# Patient Record
Sex: MALE | Race: WHITE
[De-identification: names, ages, dates, MRNs, and addresses within clinical notes are randomized per-mention and may not be internally consistent; named-entity substitution may affect disease eponyms.]

---

## 2017-08-03 ENCOUNTER — HOSPITAL ENCOUNTER (OUTPATIENT)
Dept: HOSPITAL 83 - US | Age: 47
Discharge: HOME | End: 2017-08-03
Attending: NURSE PRACTITIONER
Payer: COMMERCIAL

## 2017-08-03 DIAGNOSIS — I42.8: ICD-10-CM

## 2017-08-03 DIAGNOSIS — M79.662: ICD-10-CM

## 2017-08-03 DIAGNOSIS — M79.671: ICD-10-CM

## 2017-08-03 DIAGNOSIS — E66.9: ICD-10-CM

## 2017-08-03 DIAGNOSIS — M79.672: Primary | ICD-10-CM

## 2017-08-03 DIAGNOSIS — M79.661: ICD-10-CM

## 2018-06-08 ENCOUNTER — HOSPITAL ENCOUNTER (INPATIENT)
Dept: HOSPITAL 83 - ED | Age: 48
LOS: 3 days | Discharge: HOME | DRG: 292 | End: 2018-06-11
Attending: EMERGENCY MEDICINE | Admitting: EMERGENCY MEDICINE
Payer: COMMERCIAL

## 2018-06-08 VITALS — HEIGHT: 65 IN | WEIGHT: 315 LBS | BODY MASS INDEX: 52.48 KG/M2

## 2018-06-08 VITALS — SYSTOLIC BLOOD PRESSURE: 146 MMHG | DIASTOLIC BLOOD PRESSURE: 93 MMHG

## 2018-06-08 VITALS — DIASTOLIC BLOOD PRESSURE: 65 MMHG

## 2018-06-08 VITALS — DIASTOLIC BLOOD PRESSURE: 93 MMHG

## 2018-06-08 VITALS — DIASTOLIC BLOOD PRESSURE: 94 MMHG

## 2018-06-08 VITALS — SYSTOLIC BLOOD PRESSURE: 132 MMHG | DIASTOLIC BLOOD PRESSURE: 87 MMHG

## 2018-06-08 VITALS — SYSTOLIC BLOOD PRESSURE: 132 MMHG | DIASTOLIC BLOOD PRESSURE: 70 MMHG

## 2018-06-08 VITALS — DIASTOLIC BLOOD PRESSURE: 70 MMHG

## 2018-06-08 VITALS — DIASTOLIC BLOOD PRESSURE: 84 MMHG

## 2018-06-08 DIAGNOSIS — E78.2: ICD-10-CM

## 2018-06-08 DIAGNOSIS — J44.9: ICD-10-CM

## 2018-06-08 DIAGNOSIS — I11.0: Primary | ICD-10-CM

## 2018-06-08 DIAGNOSIS — Z84.1: ICD-10-CM

## 2018-06-08 DIAGNOSIS — Z88.8: ICD-10-CM

## 2018-06-08 DIAGNOSIS — I42.9: ICD-10-CM

## 2018-06-08 DIAGNOSIS — E66.01: ICD-10-CM

## 2018-06-08 DIAGNOSIS — Z95.810: ICD-10-CM

## 2018-06-08 DIAGNOSIS — F41.1: ICD-10-CM

## 2018-06-08 DIAGNOSIS — I50.23: ICD-10-CM

## 2018-06-08 DIAGNOSIS — M19.90: ICD-10-CM

## 2018-06-08 DIAGNOSIS — Z79.899: ICD-10-CM

## 2018-06-08 DIAGNOSIS — Z82.49: ICD-10-CM

## 2018-06-08 DIAGNOSIS — Z80.9: ICD-10-CM

## 2018-06-08 DIAGNOSIS — I34.0: ICD-10-CM

## 2018-06-08 DIAGNOSIS — K21.9: ICD-10-CM

## 2018-06-08 DIAGNOSIS — R65.10: ICD-10-CM

## 2018-06-08 LAB
ALBUMIN SERPL-MCNC: 3.3 GM/DL (ref 3.1–4.5)
ALP SERPL-CCNC: 90 U/L (ref 45–117)
ALT SERPL W P-5'-P-CCNC: 39 U/L (ref 12–78)
AST SERPL-CCNC: 65 IU/L (ref 3–35)
BASOPHILS # BLD AUTO: 1 % (ref 0–1)
BUN SERPL-MCNC: 12 MG/DL (ref 7–24)
CHLORIDE SERPL-SCNC: 105 MMOL/L (ref 98–107)
CREAT SERPL-MCNC: 0.91 MG/DL (ref 0.7–1.3)
ERYTHROCYTE [DISTWIDTH] IN BLOOD BY AUTOMATED COUNT: 13.2 % (ref 0–14.5)
HCT VFR BLD AUTO: 41.3 % (ref 42–52)
HGB BLD-MCNC: 13.6 G/DL (ref 14–18)
MCH RBC QN AUTO: 30 PG (ref 27–31)
MCHC RBC AUTO-ENTMCNC: 32.9 G/DL (ref 33–37)
MCV RBC AUTO: 91 FL (ref 80–94)
NRBC BLD QL AUTO: 0 % (ref 0–0)
PLATELET # BLD AUTO: 201 10*3/UL (ref 130–400)
PLATELET SUFFICIENCY: NORMAL
PMV BLD AUTO: 9.6 FL (ref 9.6–12.3)
POTASSIUM SERPL-SCNC: 4.5 MMOL/L (ref 3.5–5.1)
PROT SERPL-MCNC: 7.1 GM/DL (ref 6.4–8.2)
RBC # BLD AUTO: 4.54 10*6/UL (ref 4.5–5.9)
RBC MORPH BLD: NORMAL
SODIUM SERPL-SCNC: 138 MMOL/L (ref 136–145)
TOTAL CELLS COUNTED: 100 #CELLS
TROPONIN I SERPL-MCNC: 0.06 NG/ML (ref ?–0.04)
WBC NRBC COR # BLD AUTO: 12.6 10*3/UL (ref 4.8–10.8)

## 2018-06-09 VITALS — SYSTOLIC BLOOD PRESSURE: 133 MMHG | DIASTOLIC BLOOD PRESSURE: 65 MMHG

## 2018-06-09 VITALS — SYSTOLIC BLOOD PRESSURE: 112 MMHG | DIASTOLIC BLOOD PRESSURE: 80 MMHG

## 2018-06-09 VITALS — DIASTOLIC BLOOD PRESSURE: 43 MMHG

## 2018-06-09 VITALS — SYSTOLIC BLOOD PRESSURE: 118 MMHG | DIASTOLIC BLOOD PRESSURE: 72 MMHG

## 2018-06-09 VITALS — DIASTOLIC BLOOD PRESSURE: 62 MMHG

## 2018-06-09 LAB
25(OH)D3 SERPL-MCNC: 15.9 NG/ML (ref 30–100)
ALBUMIN SERPL-MCNC: 3.5 GM/DL (ref 3.1–4.5)
ALP SERPL-CCNC: 76 U/L (ref 45–117)
ALT SERPL W P-5'-P-CCNC: 36 U/L (ref 12–78)
AST SERPL-CCNC: 37 IU/L (ref 3–35)
BUN SERPL-MCNC: 16 MG/DL (ref 7–24)
CHLORIDE SERPL-SCNC: 98 MMOL/L (ref 98–107)
CHOLEST SERPL-MCNC: 122 MG/DL (ref ?–200)
CREAT SERPL-MCNC: 1.11 MG/DL (ref 0.7–1.3)
ERYTHROCYTE [DISTWIDTH] IN BLOOD BY AUTOMATED COUNT: 13.4 % (ref 0–14.5)
HCT VFR BLD AUTO: 42.3 % (ref 42–52)
HDLC SERPL-MCNC: 26 MG/DL (ref 40–60)
HGB BLD-MCNC: 13.8 G/DL (ref 14–18)
LDLC SERPL DIRECT ASSAY-MCNC: 37 MG/DL (ref 9–159)
MCH RBC QN AUTO: 29.7 PG (ref 27–31)
MCHC RBC AUTO-ENTMCNC: 32.6 G/DL (ref 33–37)
MCV RBC AUTO: 91 FL (ref 80–94)
NRBC BLD QL AUTO: 0 % (ref 0–0)
PHOSPHATE SERPL-MCNC: 4.3 MG/DL (ref 2.5–4.9)
PLATELET # BLD AUTO: 231 10*3/UL (ref 130–400)
PLATELET SUFFICIENCY: NORMAL
PMV BLD AUTO: 9.6 FL (ref 9.6–12.3)
POTASSIUM SERPL-SCNC: 3.9 MMOL/L (ref 3.5–5.1)
PROT SERPL-MCNC: 7.2 GM/DL (ref 6.4–8.2)
RBC # BLD AUTO: 4.65 10*6/UL (ref 4.5–5.9)
RBC MORPH BLD: NORMAL
SODIUM SERPL-SCNC: 137 MMOL/L (ref 136–145)
T4 FREE SERPL-MCNC: 1.06 NG/DL (ref 0.76–1.46)
TOTAL CELLS COUNTED: 100 #CELLS
TRIGL SERPL-MCNC: 295 MG/DL (ref ?–150)
TSH SERPL DL<=0.005 MIU/L-ACNC: 3.43 UIU/ML (ref 0.36–4.75)
VITAMIN B12: 256 PG/ML (ref 247–911)
VLDLC SERPL CALC-MCNC: 59 MG/DL (ref 6–40)
WBC NRBC COR # BLD AUTO: 15.4 10*3/UL (ref 4.8–10.8)

## 2018-06-10 VITALS — DIASTOLIC BLOOD PRESSURE: 71 MMHG | SYSTOLIC BLOOD PRESSURE: 113 MMHG

## 2018-06-10 VITALS — DIASTOLIC BLOOD PRESSURE: 55 MMHG | SYSTOLIC BLOOD PRESSURE: 109 MMHG

## 2018-06-10 VITALS — DIASTOLIC BLOOD PRESSURE: 70 MMHG | SYSTOLIC BLOOD PRESSURE: 121 MMHG

## 2018-06-10 VITALS — DIASTOLIC BLOOD PRESSURE: 67 MMHG

## 2018-06-10 VITALS — DIASTOLIC BLOOD PRESSURE: 64 MMHG

## 2018-06-10 LAB
BASOPHILS # BLD AUTO: 1 % (ref 0–1)
BUN SERPL-MCNC: 22 MG/DL (ref 7–24)
CHLORIDE SERPL-SCNC: 97 MMOL/L (ref 98–107)
CREAT SERPL-MCNC: 0.93 MG/DL (ref 0.7–1.3)
ERYTHROCYTE [DISTWIDTH] IN BLOOD BY AUTOMATED COUNT: 13.4 % (ref 0–14.5)
HCT VFR BLD AUTO: 40.5 % (ref 42–52)
HGB BLD-MCNC: 13.6 G/DL (ref 14–18)
MCH RBC QN AUTO: 30.3 PG (ref 27–31)
MCHC RBC AUTO-ENTMCNC: 33.6 G/DL (ref 33–37)
MCV RBC AUTO: 90.2 FL (ref 80–94)
NRBC BLD QL AUTO: 0 % (ref 0–0)
PLATELET # BLD AUTO: 225 10*3/UL (ref 130–400)
PLATELET SUFFICIENCY: NORMAL
PMV BLD AUTO: 9.5 FL (ref 9.6–12.3)
POTASSIUM SERPL-SCNC: 3.7 MMOL/L (ref 3.5–5.1)
RBC # BLD AUTO: 4.49 10*6/UL (ref 4.5–5.9)
RBC MORPH BLD: NORMAL
SODIUM SERPL-SCNC: 136 MMOL/L (ref 136–145)
TOTAL CELLS COUNTED: 100 #CELLS
WBC NRBC COR # BLD AUTO: 15.5 10*3/UL (ref 4.8–10.8)

## 2018-06-11 VITALS — DIASTOLIC BLOOD PRESSURE: 58 MMHG

## 2018-06-11 VITALS — SYSTOLIC BLOOD PRESSURE: 124 MMHG | DIASTOLIC BLOOD PRESSURE: 77 MMHG

## 2018-06-11 VITALS — DIASTOLIC BLOOD PRESSURE: 63 MMHG

## 2018-06-11 VITALS — DIASTOLIC BLOOD PRESSURE: 45 MMHG | SYSTOLIC BLOOD PRESSURE: 105 MMHG

## 2018-06-11 LAB
BUN SERPL-MCNC: 23 MG/DL (ref 7–24)
CHLORIDE SERPL-SCNC: 97 MMOL/L (ref 98–107)
CREAT SERPL-MCNC: 1.09 MG/DL (ref 0.7–1.3)
ERYTHROCYTE [DISTWIDTH] IN BLOOD BY AUTOMATED COUNT: 13.6 % (ref 0–14.5)
HCT VFR BLD AUTO: 42.3 % (ref 42–52)
HGB BLD-MCNC: 13.9 G/DL (ref 14–18)
MCH RBC QN AUTO: 30.3 PG (ref 27–31)
MCHC RBC AUTO-ENTMCNC: 32.9 G/DL (ref 33–37)
MCV RBC AUTO: 92.2 FL (ref 80–94)
NRBC BLD QL AUTO: 0 % (ref 0–0)
PLATELET # BLD AUTO: 252 10*3/UL (ref 130–400)
PLATELET SUFFICIENCY: NORMAL
PMV BLD AUTO: 9.4 FL (ref 9.6–12.3)
POTASSIUM SERPL-SCNC: 3.7 MMOL/L (ref 3.5–5.1)
RBC # BLD AUTO: 4.59 10*6/UL (ref 4.5–5.9)
RBC MORPH BLD: NORMAL
SODIUM SERPL-SCNC: 136 MMOL/L (ref 136–145)
TOTAL CELLS COUNTED: 100 #CELLS
VARIANT LYMPHS NFR BLD MANUAL: 1 % (ref 0–0)
WBC NRBC COR # BLD AUTO: 17.3 10*3/UL (ref 4.8–10.8)

## 2018-12-29 ENCOUNTER — HOSPITAL ENCOUNTER (EMERGENCY)
Dept: HOSPITAL 83 - ED | Age: 48
Discharge: HOME | End: 2018-12-29
Payer: COMMERCIAL

## 2018-12-29 VITALS — WEIGHT: 295 LBS | BODY MASS INDEX: 49.15 KG/M2 | HEIGHT: 65 IN

## 2018-12-29 DIAGNOSIS — M13.859: ICD-10-CM

## 2018-12-29 DIAGNOSIS — S96.912A: Primary | ICD-10-CM
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
## 2018-12-29 DIAGNOSIS — I50.20: ICD-10-CM

## 2018-12-29 DIAGNOSIS — Z79.899: ICD-10-CM

## 2018-12-29 DIAGNOSIS — J44.9: ICD-10-CM

## 2018-12-29 DIAGNOSIS — E66.01: ICD-10-CM
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
## 2018-12-29 DIAGNOSIS — I11.0: ICD-10-CM

## 2018-12-29 DIAGNOSIS — Z88.8: ICD-10-CM
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
## 2018-12-29 DIAGNOSIS — S93.402A: ICD-10-CM

## 2019-10-03 ENCOUNTER — HOSPITAL ENCOUNTER (OUTPATIENT)
Dept: HOSPITAL 83 - RAD | Age: 49
Discharge: HOME | End: 2019-10-03
Attending: NURSE PRACTITIONER
Payer: COMMERCIAL

## 2019-10-03 DIAGNOSIS — R20.2: Primary | ICD-10-CM

## 2019-10-03 DIAGNOSIS — M47.812: ICD-10-CM

## 2020-02-12 ENCOUNTER — HOSPITAL ENCOUNTER (INPATIENT)
Dept: HOSPITAL 83 - ED | Age: 50
LOS: 4 days | Discharge: HOME | DRG: 871 | End: 2020-02-16
Attending: INTERNAL MEDICINE | Admitting: INTERNAL MEDICINE
Payer: COMMERCIAL

## 2020-02-12 VITALS — WEIGHT: 315 LBS | BODY MASS INDEX: 50.62 KG/M2 | HEIGHT: 65.98 IN

## 2020-02-12 VITALS — DIASTOLIC BLOOD PRESSURE: 70 MMHG | SYSTOLIC BLOOD PRESSURE: 133 MMHG

## 2020-02-12 VITALS — DIASTOLIC BLOOD PRESSURE: 68 MMHG

## 2020-02-12 DIAGNOSIS — I11.0: ICD-10-CM

## 2020-02-12 DIAGNOSIS — F33.2: ICD-10-CM

## 2020-02-12 DIAGNOSIS — I50.43: ICD-10-CM

## 2020-02-12 DIAGNOSIS — G93.41: ICD-10-CM

## 2020-02-12 DIAGNOSIS — Z84.1: ICD-10-CM

## 2020-02-12 DIAGNOSIS — M16.10: ICD-10-CM

## 2020-02-12 DIAGNOSIS — Z88.8: ICD-10-CM

## 2020-02-12 DIAGNOSIS — Z79.82: ICD-10-CM

## 2020-02-12 DIAGNOSIS — I42.0: ICD-10-CM

## 2020-02-12 DIAGNOSIS — R73.9: ICD-10-CM

## 2020-02-12 DIAGNOSIS — E80.6: ICD-10-CM

## 2020-02-12 DIAGNOSIS — Z83.3: ICD-10-CM

## 2020-02-12 DIAGNOSIS — Z79.899: ICD-10-CM

## 2020-02-12 DIAGNOSIS — Z95.0: ICD-10-CM

## 2020-02-12 DIAGNOSIS — Z80.9: ICD-10-CM

## 2020-02-12 DIAGNOSIS — A41.9: Primary | ICD-10-CM

## 2020-02-12 DIAGNOSIS — E83.41: ICD-10-CM

## 2020-02-12 DIAGNOSIS — F41.1: ICD-10-CM

## 2020-02-12 DIAGNOSIS — E66.01: ICD-10-CM

## 2020-02-12 DIAGNOSIS — J44.9: ICD-10-CM

## 2020-02-12 LAB
ALBUMIN SERPL-MCNC: 3.5 GM/DL (ref 3.1–4.5)
ALP SERPL-CCNC: 111 U/L (ref 45–117)
ALT SERPL W P-5'-P-CCNC: 63 U/L (ref 12–78)
AST SERPL-CCNC: 132 IU/L (ref 3–35)
BUN SERPL-MCNC: 25 MG/DL (ref 7–24)
CHLORIDE SERPL-SCNC: 103 MMOL/L (ref 98–107)
CREAT SERPL-MCNC: 1.3 MG/DL (ref 0.7–1.3)
ERYTHROCYTE [DISTWIDTH] IN BLOOD BY AUTOMATED COUNT: 13.9 % (ref 0–14.5)
HCT VFR BLD AUTO: 46.9 % (ref 42–52)
HGB BLD-MCNC: 15.1 G/DL (ref 14–18)
MCH RBC QN AUTO: 29.3 PG (ref 27–31)
MCHC RBC AUTO-ENTMCNC: 32.2 G/DL (ref 33–37)
MCV RBC AUTO: 90.9 FL (ref 80–94)
NRBC BLD QL AUTO: 0 % (ref 0–0)
PLATELET # BLD AUTO: 263 10*3/UL (ref 130–400)
PLATELET SUFFICIENCY: NORMAL
PMV BLD AUTO: 9.5 FL (ref 9.6–12.3)
POLYCHROMASIA BLD QL SMEAR: SLIGHT
POTASSIUM SERPL-SCNC: 4 MMOL/L (ref 3.5–5.1)
PROT SERPL-MCNC: 7.2 GM/DL (ref 6.4–8.2)
RBC # BLD AUTO: 5.16 10*6/UL (ref 4.5–5.9)
SODIUM SERPL-SCNC: 136 MMOL/L (ref 136–145)
TOTAL CELLS COUNTED: 100 #CELLS
TROPONIN I SERPL-MCNC: 0.05 NG/ML (ref ?–0.04)
WBC NRBC COR # BLD AUTO: 18.4 10*3/UL (ref 4.8–10.8)

## 2020-02-13 VITALS — DIASTOLIC BLOOD PRESSURE: 80 MMHG

## 2020-02-13 VITALS — DIASTOLIC BLOOD PRESSURE: 86 MMHG

## 2020-02-13 VITALS — DIASTOLIC BLOOD PRESSURE: 63 MMHG | SYSTOLIC BLOOD PRESSURE: 101 MMHG

## 2020-02-13 VITALS — SYSTOLIC BLOOD PRESSURE: 105 MMHG | DIASTOLIC BLOOD PRESSURE: 71 MMHG

## 2020-02-13 VITALS — DIASTOLIC BLOOD PRESSURE: 70 MMHG | SYSTOLIC BLOOD PRESSURE: 118 MMHG

## 2020-02-13 VITALS — SYSTOLIC BLOOD PRESSURE: 120 MMHG | DIASTOLIC BLOOD PRESSURE: 71 MMHG

## 2020-02-13 VITALS — SYSTOLIC BLOOD PRESSURE: 100 MMHG | DIASTOLIC BLOOD PRESSURE: 75 MMHG

## 2020-02-13 VITALS — DIASTOLIC BLOOD PRESSURE: 93 MMHG

## 2020-02-13 VITALS — DIASTOLIC BLOOD PRESSURE: 67 MMHG

## 2020-02-13 VITALS — DIASTOLIC BLOOD PRESSURE: 79 MMHG | SYSTOLIC BLOOD PRESSURE: 118 MMHG

## 2020-02-13 LAB
25(OH)D3 SERPL-MCNC: 53.4 NG/ML (ref 30–100)
ALBUMIN SERPL-MCNC: 3.7 GM/DL (ref 3.1–4.5)
ALP SERPL-CCNC: 107 U/L (ref 45–117)
ALT SERPL W P-5'-P-CCNC: 73 U/L (ref 12–78)
APTT PPP: 30.4 SECONDS (ref 20–32.1)
AST SERPL-CCNC: 165 IU/L (ref 3–35)
BASE EXCESS BLDA CALC-SCNC: -1.7 MMOL/L (ref -2–2)
BUN SERPL-MCNC: 26 MG/DL (ref 7–24)
CHLORIDE SERPL-SCNC: 105 MMOL/L (ref 98–107)
CHOLEST SERPL-MCNC: 97 MG/DL (ref ?–200)
CREAT SERPL-MCNC: 1.4 MG/DL (ref 0.7–1.3)
ERYTHROCYTE [DISTWIDTH] IN BLOOD BY AUTOMATED COUNT: 13.9 % (ref 0–14.5)
HCO3 BLDA-SCNC: 23 MMOL/L (ref 22–26)
HCT VFR BLD AUTO: 46.5 % (ref 42–52)
HDLC SERPL-MCNC: 22 MG/DL (ref 40–60)
HGB BLD-MCNC: 15.1 G/DL (ref 14–18)
INR BLD: 1.1 (ref 2–3.5)
LDLC SERPL DIRECT ASSAY-MCNC: 51 MG/DL (ref 9–159)
MCH RBC QN AUTO: 29.5 PG (ref 27–31)
MCHC RBC AUTO-ENTMCNC: 32.5 G/DL (ref 33–37)
MCV RBC AUTO: 91 FL (ref 80–94)
NRBC BLD QL AUTO: 0 10*3/UL (ref 0–0)
PCO2 BLDA: 39 MMHG (ref 35–45)
PH BLDA: 7.38 [PH] (ref 7.35–7.45)
PHOSPHATE SERPL-MCNC: 4.9 MG/DL (ref 2.5–4.9)
PLATELET # BLD AUTO: 240 10*3/UL (ref 130–400)
PLATELET SUFFICIENCY: NORMAL
PMV BLD AUTO: 10.3 FL (ref 9.6–12.3)
PO2 BLDA: 82 MMHG (ref 80–90)
POTASSIUM SERPL-SCNC: 3.8 MMOL/L (ref 3.5–5.1)
PROT SERPL-MCNC: 7.5 GM/DL (ref 6.4–8.2)
RBC # BLD AUTO: 5.11 10*6/UL (ref 4.5–5.9)
SAO2 % BLDA: 96 % (ref 95–97)
SODIUM SERPL-SCNC: 138 MMOL/L (ref 136–145)
T4 FREE SERPL-MCNC: 1.34 NG/DL (ref 0.76–1.46)
TOTAL CELLS COUNTED: 100 #CELLS
TRIGL SERPL-MCNC: 118 MG/DL (ref ?–150)
TSH SERPL DL<=0.005 MIU/L-ACNC: 2.93 UIU/ML (ref 0.36–4.75)
VARIANT LYMPHS NFR BLD MANUAL: 1 % (ref 0–0)
VITAMIN B12: 363 PG/ML (ref 247–911)
VLDLC SERPL CALC-MCNC: 24 MG/DL (ref 6–40)
WBC NRBC COR # BLD AUTO: 18.6 10*3/UL (ref 4.8–10.8)

## 2020-02-13 PROCEDURE — 4B02XSZ MEASUREMENT OF CARDIAC PACEMAKER, EXTERNAL APPROACH: ICD-10-PCS | Performed by: INTERNAL MEDICINE

## 2020-02-13 NOTE — NUR
0130 DR MCFARLAND MADE AWARE THE PATIENT CANNOT LAY DOWN AT THIS TIME FOR HTE CAT
SCAN . LEO WETZEL RN.

## 2020-02-13 NOTE — NUR
DR PATEL IN TO SEE PT.  UPDATED HIM ON PT'S CONDITION AND PLAN OF CARE.  NO
NEW ORDERS AT THIS TIME.

## 2020-02-13 NOTE — NUR
PHYSICAL THERAPY
 
Screen received pt from home admit [facility] CHF sycopal episodes w multiple falls
pt would benefit from PT eval to assess mobility and safety.
 
 
Sherlyn Munoz PT

## 2020-02-13 NOTE — NUR
DAYANARAAUGUSTO [id number]
 
Please refer to the physician's history and physical for past medical history,
comorbid conditions, and allergies.
   Diagnosis: CHF,NSTEMI,PACEMAKER COMPLICATIONS
 
   Nicholas Score: 20,LOW OR NO RISK
 
WOUND DESCRIPTIONS:
WOUND #1 RIGHT GROIN/THIGH/ABD FOLD RED WITH MUSTY ODOR NOTED AT TIME OF
ASSESSMENT. UNABLE TO OBTAIN ACCURATE MEASUREMENTS DUE TO PATIENT WAS FALLING
ASLEEP DURING ASSESSMENT. UNABLE TO ASSESS LEFT SIDE GROIN AT THIS TIME.
 
Wound Number: 2
Location of the wound: LEFT ANTERIOR LOWER LEG
Type of wound: TRAUMATIC
Thickness: Full
Size: 1cm X 1.2cm X 0.1cm
Tunneling: NONE
Undermining: NONE
Sinus Tract: NONE
Presence of Exudate: Serous
Amount: Light
Color: Yellow
Odor: None
Periwound Skin Appearance: Edema
Wound edges: APPROXIMATED
Pain (associated with wound): DENIED AT TIME OF ASSESSMENT
How does patient state this happened? NURSE CARING FOR PATIENT STATES THE
PATIENT STATED HE HIT THIS AREA WHEN HE FELL AT HOME.
   Surface the patient is resting on: Isoflex
 
SKIN PREVENTION RECOMMENDATION:
   1.  Pressure redistribution support surface as appropriate
   2.  Elevate heels
   3.  Remove boots/TEDS every shift and reapply
   4.  Head of bed 30 degrees as tolerated
   5.  Assess nutrition and hydration
   6.  Manage moisture
   7.  Avoid the use of containment devices while in bed
   8.  Use absorptive products on surfaces limit layers of linens on bed
   9.  Turn and reposition every 1-2 hours in bed and every 1 hour in chair as
       tolerated
   10. Weight shifts every 15 minutes while up in chair
   11. Offloading with pillows or device to keep heels elevated off bed
   12. Monitor skin at least every shift
   13. Inspect under medical devices twice a day
 
 
WOUND TREATMENT RECOMMENDATIONS:
FULL THICKNESS GUIDELINES LEFT ANTERIOR LOWER LEG: CLEANSE WITH NSS APPLY
SUREPREP AROUND THE WOUND BED ALLOW TO DRY APPLY THERAHONEY TO WOUND BED COVER
WITH OPTIFOAM GENTLE. CHANGE EVERY 2 DAYS AND PRN SOILING.
DRESSING CHANGE: CLEANSE GROIN AND FOLDS WITH SOAP AND WATER. CONTINUE
NYSTATIN POWDER TO GROIN AND FOLDS.

## 2020-02-13 NOTE — NUR
HAYDEE NICHOLE [id number]
 
Please refer to the physician's history and physical for past medical history,
comorbid conditions, and allergies.
   Diagnosis: CHF,NSTEMI,PACEMAKER COMPLICATIONS
 
   Nicholas Score: 20,LOW OR NO RISK
 
WOUND DESCRIPTIONS:
This nurse went to evaluate patient for skin impairment noted to groin patient
refused assessment at this time. Musty odor noted at time of assessment per
nurse caring for patient.
   Surface the patient is resting on: Isoflex
 
SKIN PREVENTION RECOMMENDATION:
   1.  Pressure redistribution support surface as appropriate
   2.  Elevate heels
   3.  Remove boots/TEDS every shift and reapply
   4.  Head of bed 30 degrees as tolerated
   5.  Assess nutrition and hydration
   6.  Manage moisture
   7.  Avoid the use of containment devices while in bed
   8.  Use absorptive products on surfaces limit layers of linens on bed
   9.  Turn and reposition every 1-2 hours in bed and every 1 hour in chair as
       tolerated
   10. Weight shifts every 15 minutes while up in chair
   11. Offloading with pillows or device to keep heels elevated off bed
   12. Monitor skin at least every shift
   13. Inspect under medical devices twice a day
 
WOUND TREATMENT RECOMMENDATIONS:
Nystatin powder was ordered by the provider for every 12 hours.

## 2020-02-13 NOTE — NUR
PT IS AWAKE AND ALERT.  PT IS TALKING WITH HIS BROTHER ON THE PHONE.  PT
DENIES COMPLAINTS AT PRESENT TIME.  NO ACUTE DISTRESS NOTED.

## 2020-02-13 NOTE — NUR
NOTED ON CARDIAC MONITOR THE PATIENTS HEART RATE WAS AT 42. THEN 36. THE MD
MADE AWARE. THE PATIENT REMAINS AWAKE ALERT AND ORIENTED AT THE TIME DENIES ANY
CHEST PAIN OR SOB. LEO WETZEL RN.
 
0010 PT PLACED ON 3 LITERS NC DUE TO THE PATIENT DOZES OFF AND PULSE OX DROPS
TO 87%. THE PATIENT STATES HE IS UNABLE TO TOLERATE THE NC STATES IT NAKES HIM
ANXIOUS. AND TOOK IT OFF. LEO WETZEL RN.

## 2020-02-13 NOTE — NUR
[employer] REP NOTIFIED DUE TO PT'S HEARTRATE DROPPING INTO THE 30'S DESPITE
HAVING A PACEMAKER.---BRIANA DENTON RN

## 2020-02-13 NOTE — NUR
PT REMAINS SLEEPING IN RECLINER CHAIR. PT IS SOMNOLENT. TWITCHES AND TALKS TO
SELF. PT MOVES ABOUT CONSTANTLY IN THE CHAIR. BED PLACED IN FRONT OF PT CHAIR
TO PREVENT HIM FROM FALLING FORWARD. 02 INTACT. HEP LOCK INTACT. MONITOR
REMAINS 100% PACED RHYTHM. BODY ALARM INTACT. CONDITION GUARDED.

## 2020-02-13 NOTE — NUR
A 49 YEAR OLD MALE admitted to [facility], under the
services of CALISTA Hendrix DO with a diagnosis of CHF.
Chief complaint is VERTIGO AT HOME, DENIES SOB.
Patient arrived via stretcher from ER.
Monitor applied. Initial assessment completed.
Vital signs taken and recorded.
CALISTA HENDRIX DO notified of admission to the unit.
Orders received.
See assessment for past medical history, medications
and allergies.
Patient and/or family oriented to unit. [facility]
visitation policy reviewed.
Clothing/patient valuable form completed.
 
MARINA MAX

## 2020-02-13 NOTE — NUR
REMAINS IN RECLINER CHAIR AT BEDSIDE. TALKING TO HIMSELF. HAD 02 TUBING
WRAPPED AROUND HIS HEAD. BLOOD NOTED ON RIGHT HAND. (IV SITE JULIANO INFILTRATED
EARLIER). PT HAD PULLED IV OUT OF R HAND. STATES " I DON'T KNOW WHAT I WAS
DOING". ALERT AND ORIENTED. STATES " I DO THIS KIND OF STUFF AT HOME, TOO."
NEW IV STARTED RH WITH #22 ANGIO. STERILE DRSG TO SITE. FLUSHES WELL. WRAPPTED
WITH KERLIX. BODY ALARM APPLIED FOR PT SAFETY. SUB Q LOVENOX GIVEN AS ORDERED.

## 2020-02-13 NOTE — NUR
ARDEN,WOUND CARE NURSE, HERE TO SEE PT. SHE UPDATED DR SALDANA OF WOUNDS.
ECHO AND PACEMAKER INTEROGATION COMPLETED.  PACEMEAKER IS WORKING CORRECTLY,
NO EVENTS REPORTED PER PACEMAKER INTEROGATOR.  PT REMAINS SITTING IN THE
CHAIR.  PT FALLS ASLEEP REPEATEDLY EVEN WHILE TALKING.  SHORT PDS OF APNEA
NOTED WHEN PT IS ALSEEP.

## 2020-02-13 NOTE — NUR
[employer] REP IS BEING NOTIFIED TO INTERROGATE THIS PT'S
PACER---BRIANA DENTON RN

## 2020-02-13 NOTE — NUR
PT WAS UP TO BSC TO VOID AND A MUSHY BROWN STOOL.  HE RETURNS TO RECLINER AS
OBSERVED WITH STEADY GAIT.  HE IS AOX3.  HE DID AWAKEN EASILY TO VERBAL
STIMULI.  HE HAD HIS O2 OFF AND WAS 98% ON RA. HIS VS REMAIN STABLE WITH BP
MEASUREMENT TAKEN MANUALLY.  GRAPE JUICE GIVEN PER PT REQUEST, CALL LIGHT AND
BELONGINGS/TV TRAY IN REACH.

## 2020-02-13 NOTE — NUR
DR. MCFARLAND MADE AWARE EARLIER PT STATING HE HAS A RASH IN THE GROIN AREA, THAT
SMELLS OF YEAST, AND THAT PT REFUSES PICTURES OR TO LET RN VIEW. DR. MCFARLAND
SHOWED ER STRIPS OF ALLEGED HR IN THE 20-40'S. PLACED ON CHART. U CALLED AND
LEFT MESSAGE REGARDING CONSULT.

## 2020-02-14 VITALS — DIASTOLIC BLOOD PRESSURE: 72 MMHG | SYSTOLIC BLOOD PRESSURE: 91 MMHG

## 2020-02-14 VITALS — DIASTOLIC BLOOD PRESSURE: 48 MMHG

## 2020-02-14 VITALS — SYSTOLIC BLOOD PRESSURE: 104 MMHG | DIASTOLIC BLOOD PRESSURE: 60 MMHG

## 2020-02-14 VITALS — DIASTOLIC BLOOD PRESSURE: 74 MMHG | SYSTOLIC BLOOD PRESSURE: 112 MMHG

## 2020-02-14 VITALS — SYSTOLIC BLOOD PRESSURE: 106 MMHG | DIASTOLIC BLOOD PRESSURE: 78 MMHG

## 2020-02-14 VITALS — DIASTOLIC BLOOD PRESSURE: 64 MMHG

## 2020-02-14 LAB
ALBUMIN SERPL-MCNC: 3.2 GM/DL (ref 3.1–4.5)
ALP SERPL-CCNC: 96 U/L (ref 45–117)
ALT SERPL W P-5'-P-CCNC: 49 U/L (ref 12–78)
APPEARANCE UR: CLEAR
AST SERPL-CCNC: 52 IU/L (ref 3–35)
BACTERIA #/AREA URNS HPF: (no result) /[HPF]
BASOPHILS # BLD AUTO: 0.1 10*3/UL (ref 0–0.1)
BASOPHILS NFR BLD AUTO: 0.5 % (ref 0–1)
BILIRUB UR QL STRIP: NEGATIVE
BUN SERPL-MCNC: 35 MG/DL (ref 7–24)
CHLORIDE SERPL-SCNC: 105 MMOL/L (ref 98–107)
COLOR UR: YELLOW
CREAT SERPL-MCNC: 1.22 MG/DL (ref 0.7–1.3)
EOSINOPHIL # BLD AUTO: 0.5 10*3/UL (ref 0–0.4)
EOSINOPHIL # BLD AUTO: 3.8 % (ref 1–4)
ERYTHROCYTE [DISTWIDTH] IN BLOOD BY AUTOMATED COUNT: 13.9 % (ref 0–14.5)
GLUCOSE UR QL: NEGATIVE
HCT VFR BLD AUTO: 42.4 % (ref 42–52)
HGB BLD-MCNC: 13.4 G/DL (ref 14–18)
HGB UR QL STRIP: NEGATIVE
KETONES UR QL STRIP: NEGATIVE
LEUKOCYTE ESTERASE UR QL STRIP: (no result)
LYMPHOCYTES # BLD AUTO: 2 10*3/UL (ref 1.3–4.4)
LYMPHOCYTES NFR BLD AUTO: 15.7 % (ref 27–41)
MCH RBC QN AUTO: 28.9 PG (ref 27–31)
MCHC RBC AUTO-ENTMCNC: 31.6 G/DL (ref 33–37)
MCV RBC AUTO: 91.6 FL (ref 80–94)
MONOCYTES # BLD AUTO: 1 10*3/UL (ref 0.1–1)
MONOCYTES NFR BLD MANUAL: 8 % (ref 3–9)
NEUT #: 9.3 10*3/UL (ref 2.3–7.9)
NEUT %: 71.5 % (ref 47–73)
NITRITE UR QL STRIP: NEGATIVE
NRBC BLD QL AUTO: 0 % (ref 0–0)
PH UR STRIP: 6 [PH] (ref 5–9)
PLATELET # BLD AUTO: 207 10*3/UL (ref 130–400)
PMV BLD AUTO: 10.3 FL (ref 9.6–12.3)
POTASSIUM SERPL-SCNC: 3.2 MMOL/L (ref 3.5–5.1)
PROT SERPL-MCNC: 6.7 GM/DL (ref 6.4–8.2)
RBC # BLD AUTO: 4.63 10*6/UL (ref 4.5–5.9)
SODIUM SERPL-SCNC: 139 MMOL/L (ref 136–145)
SP GR UR: 1.02 (ref 1–1.03)
UROBILINOGEN UR STRIP-MCNC: 0.2 E.U./DL (ref 0.2–1)
WBC NRBC COR # BLD AUTO: 13 10*3/UL (ref 4.8–10.8)

## 2020-02-14 NOTE — NUR
in to talk to patient.
Patient states lives at home with his wife and son.
There are 15 steps in the home.
Physician: Denita Corley and Dr. Qiu
Pharmacy: [facility]
Home health services: none
Patient's level of ADLs: INDEPENDENT
Patient has working utilities: yes
DME: cane
Follow-up physician's appointment after d/c: will be made by the hospitalist
nurse director upon discharge
Does patient want to access PORTAL?: no
Discharge plan discussed with patient. He lives at home with his family. He is
independent in his ADLs and occasionally ambulates with a cane for balance.
Discussed home health care services and he denies any home needs at this time.
When medically stable he will be discharged to home. He states his wife will
provide transportation on discharge.
 
IRENA STEVENS

## 2020-02-14 NOTE — NUR
TO RECLINER AFTER USING BSC INDEPENDENTLY, ALERT AND ORIENTED, PLEASANT,
COOPERATIVE, HEP LOCK TO RAC, WOUND TO L MONIQUE DRESSED, GRION RASH KENDRA TREATED
WITH NYSTATIN POWDER, RESP EASY, 100 % PACED ON MONITOR

## 2020-02-14 NOTE — NUR
Checked in on patient on nocturnal. Pt is sleeping up in the chair still. SpO2
remains at 93% on room air. Nurses say they have heard the alarm but only for
disconnect when pt gets up to use the restroom.

## 2020-02-14 NOTE — NUR
Nutritional Support Services Note: Discussing diet with pt. Pt is currently on
a cardiac diet. He has a good understanding of his diet. He state he has lost
159# this past year. Has been trying to eat healthy. Ht.5'6 Wt.341#. Pt has a
rash to groin. Encouraged continued weight loss goals, and healthy eating.
Discussed proper portion sizes. No other recommendations at this time.
Encouraged follow up if needed.               Yessica Figueroa Rdn Ld

## 2020-02-15 VITALS — DIASTOLIC BLOOD PRESSURE: 64 MMHG | SYSTOLIC BLOOD PRESSURE: 94 MMHG

## 2020-02-15 VITALS — DIASTOLIC BLOOD PRESSURE: 61 MMHG | SYSTOLIC BLOOD PRESSURE: 124 MMHG

## 2020-02-15 VITALS — DIASTOLIC BLOOD PRESSURE: 60 MMHG | SYSTOLIC BLOOD PRESSURE: 110 MMHG

## 2020-02-15 VITALS — DIASTOLIC BLOOD PRESSURE: 69 MMHG

## 2020-02-15 VITALS — DIASTOLIC BLOOD PRESSURE: 84 MMHG

## 2020-02-15 LAB
ALBUMIN SERPL-MCNC: 3.3 GM/DL (ref 3.1–4.5)
ALP SERPL-CCNC: 93 U/L (ref 45–117)
ALT SERPL W P-5'-P-CCNC: 48 U/L (ref 12–78)
AST SERPL-CCNC: 40 IU/L (ref 3–35)
BASOPHILS # BLD AUTO: 0.1 10*3/UL (ref 0–0.1)
BASOPHILS NFR BLD AUTO: 0.7 % (ref 0–1)
BUN SERPL-MCNC: 37 MG/DL (ref 7–24)
CHLORIDE SERPL-SCNC: 106 MMOL/L (ref 98–107)
CREAT SERPL-MCNC: 1.16 MG/DL (ref 0.7–1.3)
EOSINOPHIL # BLD AUTO: 0.4 10*3/UL (ref 0–0.4)
EOSINOPHIL # BLD AUTO: 4.2 % (ref 1–4)
ERYTHROCYTE [DISTWIDTH] IN BLOOD BY AUTOMATED COUNT: 14 % (ref 0–14.5)
HCT VFR BLD AUTO: 43.3 % (ref 42–52)
HGB BLD-MCNC: 13.7 G/DL (ref 14–18)
LYMPHOCYTES # BLD AUTO: 2 10*3/UL (ref 1.3–4.4)
LYMPHOCYTES NFR BLD AUTO: 19.2 % (ref 27–41)
MCH RBC QN AUTO: 29.4 PG (ref 27–31)
MCHC RBC AUTO-ENTMCNC: 31.6 G/DL (ref 33–37)
MCV RBC AUTO: 92.9 FL (ref 80–94)
MONOCYTES # BLD AUTO: 1.2 10*3/UL (ref 0.1–1)
MONOCYTES NFR BLD MANUAL: 11.8 % (ref 3–9)
NEUT #: 6.7 10*3/UL (ref 2.3–7.9)
NEUT %: 63.7 % (ref 47–73)
NRBC BLD QL AUTO: 0 10*3/UL (ref 0–0)
PLATELET # BLD AUTO: 238 10*3/UL (ref 130–400)
PMV BLD AUTO: 10.8 FL (ref 9.6–12.3)
POTASSIUM SERPL-SCNC: 4.3 MMOL/L (ref 3.5–5.1)
PROT SERPL-MCNC: 7.1 GM/DL (ref 6.4–8.2)
RBC # BLD AUTO: 4.66 10*6/UL (ref 4.5–5.9)
SODIUM SERPL-SCNC: 138 MMOL/L (ref 136–145)
WBC NRBC COR # BLD AUTO: 10.5 10*3/UL (ref 4.8–10.8)

## 2020-02-15 NOTE — NUR
ARRIVED ON SHIFT, INTRODUCED TO PATIENT, BED IN LOW LOCKED POSITION, WHEEL
LOCKS ENGAGED, CALL LIGHT WITHIN REACH, NO NEEDS VOICED AT THIS TIME, WHITE
BOARD UPDATED.

## 2020-02-16 VITALS — SYSTOLIC BLOOD PRESSURE: 93 MMHG | DIASTOLIC BLOOD PRESSURE: 55 MMHG

## 2020-02-16 VITALS — SYSTOLIC BLOOD PRESSURE: 118 MMHG | DIASTOLIC BLOOD PRESSURE: 85 MMHG

## 2020-02-16 NOTE — NUR
Discharge instructions reviewed with patient/family. Patient receptive and
verbalizes understanding. Follow-up care arranged. Written instructions given
to patient/family, REINFORCED IMPORTANCE OF TRACKING DAILY WEIGHT AND
REPORTING WAIT GAIN OF 5 POUNDS IN 24 HOURS TO PCP, VERSED UNDERSTANDING, IV
REMOVED, LEFT VIA W/C ACCOPMPANIED BY FAMILY AND PA.
DOM CULVER

## 2020-02-16 NOTE — NUR
ARRIVED ON SHIFT, INTRODUCED TO PATIENT, PATIENT SITTING ON SIDE OF BED, BED
IN LOW POSITION, WHEELS LOCKED, CALL LIGHT WITHIN REACH, DENIES NEEDS AT THIS
TIME.

## 2020-03-24 ENCOUNTER — HOSPITAL ENCOUNTER (EMERGENCY)
Dept: HOSPITAL 83 - ED | Age: 50
Discharge: HOME | End: 2020-03-24
Payer: COMMERCIAL

## 2020-03-24 VITALS — BODY MASS INDEX: 52.48 KG/M2 | WEIGHT: 315 LBS | HEIGHT: 65 IN

## 2020-03-24 DIAGNOSIS — Z79.899: ICD-10-CM

## 2020-03-24 DIAGNOSIS — J44.9: ICD-10-CM

## 2020-03-24 DIAGNOSIS — Z88.8: ICD-10-CM

## 2020-03-24 DIAGNOSIS — M19.90: ICD-10-CM

## 2020-03-24 DIAGNOSIS — I11.0: ICD-10-CM

## 2020-03-24 DIAGNOSIS — E66.01: ICD-10-CM

## 2020-03-24 DIAGNOSIS — I50.9: Primary | ICD-10-CM

## 2020-03-24 LAB
ALBUMIN SERPL-MCNC: 3.8 GM/DL (ref 3.1–4.5)
ALP SERPL-CCNC: 94 U/L (ref 45–117)
ALT SERPL W P-5'-P-CCNC: 15 U/L (ref 12–78)
APTT PPP: 26.1 SECONDS (ref 20–32.1)
AST SERPL-CCNC: 15 IU/L (ref 3–35)
BASOPHILS # BLD AUTO: 0.1 10*3/UL (ref 0–0.1)
BASOPHILS NFR BLD AUTO: 0.6 % (ref 0–1)
BUN SERPL-MCNC: 26 MG/DL (ref 7–24)
CHLORIDE SERPL-SCNC: 107 MMOL/L (ref 98–107)
CREAT SERPL-MCNC: 1.18 MG/DL (ref 0.7–1.3)
EOSINOPHIL # BLD AUTO: 1 10*3/UL (ref 0–0.4)
EOSINOPHIL # BLD AUTO: 7 % (ref 1–4)
ERYTHROCYTE [DISTWIDTH] IN BLOOD BY AUTOMATED COUNT: 15.3 % (ref 0–14.5)
HCT VFR BLD AUTO: 47.2 % (ref 42–52)
HGB BLD-MCNC: 15.3 G/DL (ref 14–18)
INR BLD: 1 (ref 2–3.5)
LYMPHOCYTES # BLD AUTO: 2.5 10*3/UL (ref 1.3–4.4)
LYMPHOCYTES NFR BLD AUTO: 16.9 % (ref 27–41)
MCH RBC QN AUTO: 29.8 PG (ref 27–31)
MCHC RBC AUTO-ENTMCNC: 32.4 G/DL (ref 33–37)
MCV RBC AUTO: 91.8 FL (ref 80–94)
MONOCYTES # BLD AUTO: 1.4 10*3/UL (ref 0.1–1)
MONOCYTES NFR BLD MANUAL: 9.7 % (ref 3–9)
NEUT #: 9.7 10*3/UL (ref 2.3–7.9)
NEUT %: 65.4 % (ref 47–73)
NRBC BLD QL AUTO: 0 % (ref 0–0)
PLATELET # BLD AUTO: 219 10*3/UL (ref 130–400)
PMV BLD AUTO: 9.7 FL (ref 9.6–12.3)
POTASSIUM SERPL-SCNC: 4.3 MMOL/L (ref 3.5–5.1)
PROT SERPL-MCNC: 7.5 GM/DL (ref 6.4–8.2)
RBC # BLD AUTO: 5.14 10*6/UL (ref 4.5–5.9)
SODIUM SERPL-SCNC: 139 MMOL/L (ref 136–145)
TROPONIN I SERPL-MCNC: 0.03 NG/ML (ref ?–0.04)
WBC NRBC COR # BLD AUTO: 14.8 10*3/UL (ref 4.8–10.8)

## 2020-03-27 ENCOUNTER — HOSPITAL ENCOUNTER (INPATIENT)
Dept: HOSPITAL 83 - ED | Age: 50
LOS: 2 days | Discharge: HOME | DRG: 154 | End: 2020-03-29
Attending: INTERNAL MEDICINE | Admitting: INTERNAL MEDICINE
Payer: COMMERCIAL

## 2020-03-27 VITALS — BODY MASS INDEX: 52.48 KG/M2 | WEIGHT: 315 LBS | HEIGHT: 65 IN

## 2020-03-27 VITALS — SYSTOLIC BLOOD PRESSURE: 92 MMHG | DIASTOLIC BLOOD PRESSURE: 52 MMHG

## 2020-03-27 VITALS — DIASTOLIC BLOOD PRESSURE: 79 MMHG | SYSTOLIC BLOOD PRESSURE: 142 MMHG

## 2020-03-27 VITALS — DIASTOLIC BLOOD PRESSURE: 66 MMHG

## 2020-03-27 VITALS — DIASTOLIC BLOOD PRESSURE: 61 MMHG | SYSTOLIC BLOOD PRESSURE: 117 MMHG

## 2020-03-27 VITALS — DIASTOLIC BLOOD PRESSURE: 70 MMHG

## 2020-03-27 VITALS — DIASTOLIC BLOOD PRESSURE: 63 MMHG | SYSTOLIC BLOOD PRESSURE: 89 MMHG

## 2020-03-27 VITALS — DIASTOLIC BLOOD PRESSURE: 83 MMHG

## 2020-03-27 DIAGNOSIS — J96.21: ICD-10-CM

## 2020-03-27 DIAGNOSIS — E66.2: ICD-10-CM

## 2020-03-27 DIAGNOSIS — E78.5: ICD-10-CM

## 2020-03-27 DIAGNOSIS — R55: ICD-10-CM

## 2020-03-27 DIAGNOSIS — I50.23: ICD-10-CM

## 2020-03-27 DIAGNOSIS — Z83.3: ICD-10-CM

## 2020-03-27 DIAGNOSIS — Z91.040: ICD-10-CM

## 2020-03-27 DIAGNOSIS — Z80.9: ICD-10-CM
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
## 2020-03-27 DIAGNOSIS — E44.1: ICD-10-CM

## 2020-03-27 DIAGNOSIS — Z95.810: ICD-10-CM
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
## 2020-03-27 DIAGNOSIS — Z79.82: ICD-10-CM

## 2020-03-27 DIAGNOSIS — J96.22: ICD-10-CM

## 2020-03-27 DIAGNOSIS — I42.9: ICD-10-CM

## 2020-03-27 DIAGNOSIS — Z84.1: ICD-10-CM

## 2020-03-27 DIAGNOSIS — S01.312A: Primary | ICD-10-CM

## 2020-03-27 DIAGNOSIS — I11.0: ICD-10-CM

## 2020-03-27 DIAGNOSIS — J44.9: ICD-10-CM

## 2020-03-27 DIAGNOSIS — I95.9: ICD-10-CM

## 2020-03-27 DIAGNOSIS — F41.1: ICD-10-CM

## 2020-03-27 DIAGNOSIS — I25.10: ICD-10-CM
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
## 2020-03-27 DIAGNOSIS — Z79.899: ICD-10-CM

## 2020-03-27 DIAGNOSIS — Z88.8: ICD-10-CM
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
## 2020-03-27 LAB
ALBUMIN SERPL-MCNC: 3.7 GM/DL (ref 3.1–4.5)
ALP SERPL-CCNC: 93 U/L (ref 45–117)
ALT SERPL W P-5'-P-CCNC: 22 U/L (ref 12–78)
APTT PPP: 25.6 SECONDS (ref 20–32.1)
AST SERPL-CCNC: 43 IU/L (ref 3–35)
BASE EXCESS BLDA CALC-SCNC: 2.7 MMOL/L (ref -2–2)
BASE EXCESS BLDA CALC-SCNC: 2.7 MMOL/L (ref -2–2)
BASOPHILS # BLD AUTO: 0.1 10*3/UL (ref 0–0.1)
BASOPHILS NFR BLD AUTO: 0.8 % (ref 0–1)
BUN SERPL-MCNC: 33 MG/DL (ref 7–24)
CHLORIDE SERPL-SCNC: 102 MMOL/L (ref 98–107)
CREAT SERPL-MCNC: 1.29 MG/DL (ref 0.7–1.3)
EOSINOPHIL # BLD AUTO: 1.2 10*3/UL (ref 0–0.4)
EOSINOPHIL # BLD AUTO: 8.7 % (ref 1–4)
ERYTHROCYTE [DISTWIDTH] IN BLOOD BY AUTOMATED COUNT: 15.2 % (ref 0–14.5)
HCO3 BLDA-SCNC: 28 MMOL/L (ref 22–26)
HCO3 BLDA-SCNC: 30 MMOL/L (ref 22–26)
HCT VFR BLD AUTO: 47.1 % (ref 42–52)
INR BLD: 1 (ref 2–3.5)
LYMPHOCYTES # BLD AUTO: 2.7 10*3/UL (ref 1.3–4.4)
LYMPHOCYTES NFR BLD AUTO: 19 % (ref 27–41)
MCH RBC QN AUTO: 29.5 PG (ref 27–31)
MCHC RBC AUTO-ENTMCNC: 32.1 G/DL (ref 33–37)
MCV RBC AUTO: 92 FL (ref 80–94)
MONOCYTES # BLD AUTO: 1.4 10*3/UL (ref 0.1–1)
MONOCYTES NFR BLD MANUAL: 9.7 % (ref 3–9)
NEUT #: 8.7 10*3/UL (ref 2.3–7.9)
NEUT %: 61.3 % (ref 47–73)
NRBC BLD QL AUTO: 0 10*3/UL (ref 0–0)
PCO2 BLDA: 49 MMHG (ref 35–45)
PCO2 BLDA: 56 MMHG (ref 35–45)
PH BLDA: 7.34 [PH] (ref 7.35–7.45)
PH BLDA: 7.38 [PH] (ref 7.35–7.45)
PLATELET # BLD AUTO: 241 10*3/UL (ref 130–400)
PMV BLD AUTO: 10.1 FL (ref 9.6–12.3)
PO2 BLDA: 135 MMHG (ref 80–90)
PO2 BLDA: 92 MMHG (ref 80–90)
POTASSIUM SERPL-SCNC: 4.3 MMOL/L (ref 3.5–5.1)
PROT SERPL-MCNC: 7.8 GM/DL (ref 6.4–8.2)
RBC # BLD AUTO: 5.12 10*6/UL (ref 4.5–5.9)
SAO2 % BLDA: 96 % (ref 95–97)
SAO2 % BLDA: 99 % (ref 95–97)
SODIUM SERPL-SCNC: 138 MMOL/L (ref 136–145)
TROPONIN I SERPL-MCNC: 0.02 NG/ML (ref ?–0.04)
WBC NRBC COR # BLD AUTO: 14.2 10*3/UL (ref 4.8–10.8)

## 2020-03-27 PROCEDURE — 5A09357 ASSISTANCE WITH RESPIRATORY VENTILATION, LESS THAN 24 CONSECUTIVE HOURS, CONTINUOUS POSITIVE AIRWAY PRESSURE: ICD-10-PCS | Performed by: INTERNAL MEDICINE

## 2020-03-27 PROCEDURE — 0HQ3XZZ REPAIR LEFT EAR SKIN, EXTERNAL APPROACH: ICD-10-PCS

## 2020-03-28 VITALS — SYSTOLIC BLOOD PRESSURE: 117 MMHG | DIASTOLIC BLOOD PRESSURE: 78 MMHG

## 2020-03-28 VITALS — SYSTOLIC BLOOD PRESSURE: 118 MMHG | DIASTOLIC BLOOD PRESSURE: 75 MMHG

## 2020-03-28 VITALS — DIASTOLIC BLOOD PRESSURE: 75 MMHG

## 2020-03-28 VITALS — DIASTOLIC BLOOD PRESSURE: 70 MMHG

## 2020-03-28 VITALS — DIASTOLIC BLOOD PRESSURE: 73 MMHG

## 2020-03-28 LAB
ALBUMIN SERPL-MCNC: 3.7 GM/DL (ref 3.1–4.5)
ALP SERPL-CCNC: 83 U/L (ref 45–117)
ALT SERPL W P-5'-P-CCNC: 17 U/L (ref 12–78)
AST SERPL-CCNC: 28 IU/L (ref 3–35)
BASOPHILS # BLD AUTO: 0.1 10*3/UL (ref 0–0.1)
BASOPHILS NFR BLD AUTO: 0.6 % (ref 0–1)
BUN SERPL-MCNC: 25 MG/DL (ref 7–24)
CHLORIDE SERPL-SCNC: 102 MMOL/L (ref 98–107)
CREAT SERPL-MCNC: 1.07 MG/DL (ref 0.7–1.3)
EOSINOPHIL # BLD AUTO: 0.9 10*3/UL (ref 0–0.4)
EOSINOPHIL # BLD AUTO: 7.1 % (ref 1–4)
ERYTHROCYTE [DISTWIDTH] IN BLOOD BY AUTOMATED COUNT: 15.1 % (ref 0–14.5)
HCT VFR BLD AUTO: 48 % (ref 42–52)
LYMPHOCYTES # BLD AUTO: 2 10*3/UL (ref 1.3–4.4)
LYMPHOCYTES NFR BLD AUTO: 15.7 % (ref 27–41)
MCH RBC QN AUTO: 29.3 PG (ref 27–31)
MCHC RBC AUTO-ENTMCNC: 31.7 G/DL (ref 33–37)
MCV RBC AUTO: 92.7 FL (ref 80–94)
MONOCYTES # BLD AUTO: 1.1 10*3/UL (ref 0.1–1)
MONOCYTES NFR BLD MANUAL: 8.7 % (ref 3–9)
NEUT #: 8.4 10*3/UL (ref 2.3–7.9)
NEUT %: 67.5 % (ref 47–73)
NRBC BLD QL AUTO: 0 % (ref 0–0)
PHOSPHATE SERPL-MCNC: 3.6 MG/DL (ref 2.5–4.9)
PLATELET # BLD AUTO: 222 10*3/UL (ref 130–400)
PMV BLD AUTO: 10.2 FL (ref 9.6–12.3)
POTASSIUM SERPL-SCNC: 4.3 MMOL/L (ref 3.5–5.1)
PROT SERPL-MCNC: 7.4 GM/DL (ref 6.4–8.2)
RBC # BLD AUTO: 5.18 10*6/UL (ref 4.5–5.9)
SODIUM SERPL-SCNC: 139 MMOL/L (ref 136–145)
WBC NRBC COR # BLD AUTO: 12.4 10*3/UL (ref 4.8–10.8)

## 2020-03-29 VITALS — SYSTOLIC BLOOD PRESSURE: 100 MMHG | DIASTOLIC BLOOD PRESSURE: 72 MMHG

## 2020-03-29 VITALS — SYSTOLIC BLOOD PRESSURE: 109 MMHG | DIASTOLIC BLOOD PRESSURE: 66 MMHG

## 2020-03-29 VITALS — DIASTOLIC BLOOD PRESSURE: 79 MMHG | SYSTOLIC BLOOD PRESSURE: 103 MMHG

## 2020-03-29 LAB
BASOPHILS # BLD AUTO: 0.1 10*3/UL (ref 0–0.1)
BASOPHILS NFR BLD AUTO: 0.6 % (ref 0–1)
BUN SERPL-MCNC: 28 MG/DL (ref 7–24)
CHLORIDE SERPL-SCNC: 102 MMOL/L (ref 98–107)
CREAT SERPL-MCNC: 1.08 MG/DL (ref 0.7–1.3)
EOSINOPHIL # BLD AUTO: 0.7 10*3/UL (ref 0–0.4)
EOSINOPHIL # BLD AUTO: 5.7 % (ref 1–4)
ERYTHROCYTE [DISTWIDTH] IN BLOOD BY AUTOMATED COUNT: 14.9 % (ref 0–14.5)
HCT VFR BLD AUTO: 46 % (ref 42–52)
LYMPHOCYTES # BLD AUTO: 2.2 10*3/UL (ref 1.3–4.4)
LYMPHOCYTES NFR BLD AUTO: 18.1 % (ref 27–41)
MCH RBC QN AUTO: 29.3 PG (ref 27–31)
MCHC RBC AUTO-ENTMCNC: 31.3 G/DL (ref 33–37)
MCV RBC AUTO: 93.5 FL (ref 80–94)
MONOCYTES # BLD AUTO: 1.2 10*3/UL (ref 0.1–1)
MONOCYTES NFR BLD MANUAL: 10.1 % (ref 3–9)
NEUT #: 7.8 10*3/UL (ref 2.3–7.9)
NEUT %: 64.9 % (ref 47–73)
NRBC BLD QL AUTO: 0 10*3/UL (ref 0–0)
PLATELET # BLD AUTO: 205 10*3/UL (ref 130–400)
PMV BLD AUTO: 9.8 FL (ref 9.6–12.3)
POTASSIUM SERPL-SCNC: 4 MMOL/L (ref 3.5–5.1)
RBC # BLD AUTO: 4.92 10*6/UL (ref 4.5–5.9)
SODIUM SERPL-SCNC: 138 MMOL/L (ref 136–145)
WBC NRBC COR # BLD AUTO: 12.1 10*3/UL (ref 4.8–10.8)

## 2020-10-03 ENCOUNTER — HOSPITAL ENCOUNTER (INPATIENT)
Dept: HOSPITAL 83 - ED | Age: 50
LOS: 4 days | Discharge: HOME | DRG: 602 | End: 2020-10-07
Attending: STUDENT IN AN ORGANIZED HEALTH CARE EDUCATION/TRAINING PROGRAM | Admitting: STUDENT IN AN ORGANIZED HEALTH CARE EDUCATION/TRAINING PROGRAM
Payer: COMMERCIAL

## 2020-10-03 VITALS — DIASTOLIC BLOOD PRESSURE: 62 MMHG | SYSTOLIC BLOOD PRESSURE: 96 MMHG

## 2020-10-03 VITALS — DIASTOLIC BLOOD PRESSURE: 73 MMHG | SYSTOLIC BLOOD PRESSURE: 98 MMHG

## 2020-10-03 VITALS — BODY MASS INDEX: 45.1 KG/M2 | HEIGHT: 70 IN | WEIGHT: 315 LBS

## 2020-10-03 VITALS — DIASTOLIC BLOOD PRESSURE: 49 MMHG | SYSTOLIC BLOOD PRESSURE: 86 MMHG

## 2020-10-03 VITALS — DIASTOLIC BLOOD PRESSURE: 62 MMHG | SYSTOLIC BLOOD PRESSURE: 102 MMHG

## 2020-10-03 DIAGNOSIS — R74.8: ICD-10-CM

## 2020-10-03 DIAGNOSIS — R79.89: ICD-10-CM

## 2020-10-03 DIAGNOSIS — I87.2: ICD-10-CM

## 2020-10-03 DIAGNOSIS — D64.9: ICD-10-CM

## 2020-10-03 DIAGNOSIS — N18.9: ICD-10-CM

## 2020-10-03 DIAGNOSIS — N17.0: ICD-10-CM

## 2020-10-03 DIAGNOSIS — E87.8: ICD-10-CM

## 2020-10-03 DIAGNOSIS — I11.0: ICD-10-CM

## 2020-10-03 DIAGNOSIS — Z80.8: ICD-10-CM

## 2020-10-03 DIAGNOSIS — B37.2: ICD-10-CM

## 2020-10-03 DIAGNOSIS — I13.0: ICD-10-CM

## 2020-10-03 DIAGNOSIS — M16.10: ICD-10-CM

## 2020-10-03 DIAGNOSIS — Z91.040: ICD-10-CM

## 2020-10-03 DIAGNOSIS — Z87.442: ICD-10-CM

## 2020-10-03 DIAGNOSIS — E66.2: ICD-10-CM
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
## 2020-10-03 DIAGNOSIS — J44.9: ICD-10-CM

## 2020-10-03 DIAGNOSIS — I50.23: ICD-10-CM

## 2020-10-03 DIAGNOSIS — S30.811A: ICD-10-CM

## 2020-10-03 DIAGNOSIS — I42.8: ICD-10-CM

## 2020-10-03 DIAGNOSIS — Z95.0: ICD-10-CM

## 2020-10-03 DIAGNOSIS — Z83.3: ICD-10-CM

## 2020-10-03 DIAGNOSIS — E43: ICD-10-CM

## 2020-10-03 DIAGNOSIS — L03.116: Primary | ICD-10-CM
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
## 2020-10-03 DIAGNOSIS — Z88.8: ICD-10-CM
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
## 2020-10-03 DIAGNOSIS — F41.1: ICD-10-CM

## 2020-10-03 LAB
ALBUMIN SERPL-MCNC: 2.4 GM/DL (ref 3.1–4.5)
ALP SERPL-CCNC: 156 U/L (ref 45–117)
ALT SERPL W P-5'-P-CCNC: 8 U/L (ref 12–78)
APTT PPP: 32 SECONDS (ref 20–32.1)
AST SERPL-CCNC: 15 IU/L (ref 3–35)
BASOPHILS # BLD AUTO: 0.1 10*3/UL (ref 0–0.1)
BASOPHILS NFR BLD AUTO: 0.9 % (ref 0–1)
BUN SERPL-MCNC: 40 MG/DL (ref 7–24)
CHLORIDE SERPL-SCNC: 111 MMOL/L (ref 98–107)
CREAT SERPL-MCNC: 1.62 MG/DL (ref 0.7–1.3)
EOSINOPHIL # BLD AUTO: 0.5 10*3/UL (ref 0–0.4)
EOSINOPHIL # BLD AUTO: 5.2 % (ref 1–4)
ERYTHROCYTE [DISTWIDTH] IN BLOOD BY AUTOMATED COUNT: 18.2 % (ref 0–14.5)
HCT VFR BLD AUTO: 44.3 % (ref 42–52)
INR BLD: 1.2 (ref 2–3.5)
LYMPHOCYTES # BLD AUTO: 1.4 10*3/UL (ref 1.3–4.4)
LYMPHOCYTES NFR BLD AUTO: 14.8 % (ref 27–41)
MCH RBC QN AUTO: 28.5 PG (ref 27–31)
MCHC RBC AUTO-ENTMCNC: 30.5 G/DL (ref 33–37)
MCV RBC AUTO: 93.7 FL (ref 80–94)
MONOCYTES # BLD AUTO: 1.2 10*3/UL (ref 0.1–1)
MONOCYTES NFR BLD MANUAL: 13.1 % (ref 3–9)
NEUT #: 6 10*3/UL (ref 2.3–7.9)
NEUT %: 65.6 % (ref 47–73)
NRBC BLD QL AUTO: 0 % (ref 0–0)
PLATELET # BLD AUTO: 218 10*3/UL (ref 130–400)
PMV BLD AUTO: 9.8 FL (ref 9.6–12.3)
POTASSIUM SERPL-SCNC: 4.6 MMOL/L (ref 3.5–5.1)
PROT SERPL-MCNC: 6.2 GM/DL (ref 6.4–8.2)
RBC # BLD AUTO: 4.73 10*6/UL (ref 4.5–5.9)
SODIUM SERPL-SCNC: 139 MMOL/L (ref 136–145)
TROPONIN I SERPL-MCNC: < 0.015 NG/ML (ref ?–0.04)
WBC NRBC COR # BLD AUTO: 9.2 10*3/UL (ref 4.8–10.8)

## 2020-10-03 NOTE — NUR
IN TO ASSESS PT'S WOUNDS. PT ALLOWED NURSE TO MEASURE ALL WOUNDS BUT REFUSED
MEASURING OF HIS SCOTUM, GROIN, AND COCCYX. WILL CONTINUE TO MONITOR.

## 2020-10-03 NOTE — NUR
CALLED LAB TO SEE IF PODIATRY RESIDENT SENT WOUND CULTURE SPECIMEN, DAYLIGHT
NURSE STATED THAT HE WAS GOING TO COLLECT WHEN HE WRAPPED PATIENTS LEGS. LAB
STATES NO SPECIMEN WAS SENT. WILL WAIT TO COLLECT ONCE PODIATRY IS AVAILABLE
TO DO DRESSING CAHNGES AGAIN

## 2020-10-03 NOTE — NUR
Time: 1145
A 49  year old MALE admitted to 5E
under services of STEPHEN ISRAEL DO.
Pt. arrived via ambulatory from
ER.  Chief complaint: ONGOING INFECTION OF LEFT LOWER LEG .
 
GEE MUNOZ

## 2020-10-04 VITALS — SYSTOLIC BLOOD PRESSURE: 100 MMHG | DIASTOLIC BLOOD PRESSURE: 65 MMHG

## 2020-10-04 VITALS — DIASTOLIC BLOOD PRESSURE: 59 MMHG

## 2020-10-04 VITALS — SYSTOLIC BLOOD PRESSURE: 98 MMHG | DIASTOLIC BLOOD PRESSURE: 67 MMHG

## 2020-10-04 VITALS — DIASTOLIC BLOOD PRESSURE: 71 MMHG

## 2020-10-04 VITALS — DIASTOLIC BLOOD PRESSURE: 72 MMHG

## 2020-10-04 LAB
25(OH)D3 SERPL-MCNC: 69 NG/ML (ref 30–100)
ALBUMIN SERPL-MCNC: 2.6 GM/DL (ref 3.1–4.5)
ALP SERPL-CCNC: 158 U/L (ref 45–117)
ALT SERPL W P-5'-P-CCNC: 11 U/L (ref 12–78)
APTT PPP: 32.5 SECONDS (ref 20–32.1)
AST SERPL-CCNC: 15 IU/L (ref 3–35)
BACTERIA #/AREA URNS HPF: (no result) /[HPF]
BASOPHILS # BLD AUTO: 0.1 10*3/UL (ref 0–0.1)
BASOPHILS NFR BLD AUTO: 1.1 % (ref 0–1)
BUN SERPL-MCNC: 44 MG/DL (ref 7–24)
CHLORIDE SERPL-SCNC: 110 MMOL/L (ref 98–107)
CREAT SERPL-MCNC: 1.77 MG/DL (ref 0.7–1.3)
EOSINOPHIL # BLD AUTO: 0.5 10*3/UL (ref 0–0.4)
EOSINOPHIL # BLD AUTO: 5.6 % (ref 1–4)
EPI CELLS #/AREA URNS HPF: (no result) /[HPF]
ERYTHROCYTE [DISTWIDTH] IN BLOOD BY AUTOMATED COUNT: 18.3 % (ref 0–14.5)
HCT VFR BLD AUTO: 46.9 % (ref 42–52)
HGB UR QL STRIP: (no result)
INR BLD: 1.2 (ref 2–3.5)
LEUKOCYTE ESTERASE UR QL STRIP: (no result)
LYMPHOCYTES # BLD AUTO: 1 10*3/UL (ref 1.3–4.4)
LYMPHOCYTES NFR BLD AUTO: 11.2 % (ref 27–41)
MCH RBC QN AUTO: 28.7 PG (ref 27–31)
MCHC RBC AUTO-ENTMCNC: 30.5 G/DL (ref 33–37)
MCV RBC AUTO: 94 FL (ref 80–94)
MONOCYTES # BLD AUTO: 0.9 10*3/UL (ref 0.1–1)
MONOCYTES NFR BLD MANUAL: 9.8 % (ref 3–9)
NEUT #: 6.6 10*3/UL (ref 2.3–7.9)
NEUT %: 72 % (ref 47–73)
NRBC BLD QL AUTO: 0 10*3/UL (ref 0–0)
PH UR STRIP: 5 [PH] (ref 4.5–8)
PLATELET # BLD AUTO: 194 10*3/UL (ref 130–400)
PMV BLD AUTO: 9.9 FL (ref 9.6–12.3)
POTASSIUM SERPL-SCNC: 4.7 MMOL/L (ref 3.5–5.1)
PROT SERPL-MCNC: 6.7 GM/DL (ref 6.4–8.2)
RBC # BLD AUTO: 4.99 10*6/UL (ref 4.5–5.9)
RBC #/AREA URNS HPF: (no result) RBC/HPF (ref 0–2)
SODIUM SERPL-SCNC: 141 MMOL/L (ref 136–145)
SP GR UR: 1.01 (ref 1–1.03)
T4 FREE SERPL-MCNC: 0.92 NG/DL (ref 0.76–1.46)
TSH SERPL DL<=0.005 MIU/L-ACNC: 6.71 UIU/ML (ref 0.36–4.75)
UROBILINOGEN UR STRIP-MCNC: 1 E.U./DL (ref 0–1)
VITAMIN B12: 436 PG/ML (ref 247–911)
WBC NRBC COR # BLD AUTO: 9.1 10*3/UL (ref 4.8–10.8)

## 2020-10-04 NOTE — NUR
PATIENT RESTING IN BED. NO COMPLAINTS AT THIS TIME. PODIATRY WAS IN EARLIER
TODAY AND CHANGED DRESSINGS TO BLLE. DRESSINGS DRY AND INTACT.
PATIENT DENIES ANY PAIN AT THIS TIME.
VOICES NO CONCERNS. CALL LIGHT IN REACH.

## 2020-10-04 NOTE — NUR
PATIENT VOICED NO COMPLAINTS. NO OVERT DISTRESS NOTED. RESP ARE EASY AND
REGULAR. BED IS LOCKED IN LOWEST POSITION, CALL LIGHT WITHIN REACH

## 2020-10-05 VITALS — DIASTOLIC BLOOD PRESSURE: 56 MMHG

## 2020-10-05 VITALS — DIASTOLIC BLOOD PRESSURE: 62 MMHG

## 2020-10-05 VITALS — DIASTOLIC BLOOD PRESSURE: 90 MMHG

## 2020-10-05 VITALS — DIASTOLIC BLOOD PRESSURE: 59 MMHG | SYSTOLIC BLOOD PRESSURE: 98 MMHG

## 2020-10-05 LAB
BUN SERPL-MCNC: 41 MG/DL (ref 7–24)
CHLORIDE SERPL-SCNC: 110 MMOL/L (ref 98–107)
CREAT SERPL-MCNC: 1.56 MG/DL (ref 0.7–1.3)
POTASSIUM SERPL-SCNC: 4.7 MMOL/L (ref 3.5–5.1)
SODIUM SERPL-SCNC: 139 MMOL/L (ref 136–145)

## 2020-10-05 NOTE — NUR
PATIENT RESTING IN BED. VOICES NO CONCERNS. DENIES ANY PAIN. REMINDED PATIENT
OF ECHO AND VENOUS AND ARTERIAL US TODAY. PATIENT STATES UNDERSTANDING.
ASSESSMENT COMPLETE. BLLE DRESSING DRY AND INTACT. RESPS EASY AND REGULAR.
CALL LIGHT IN REACH.

## 2020-10-05 NOTE — NUR
PHYSICAL THERAPY
 
 
Screen received  admitted with acute CHF and BLE cellulitis, please consult PT
if pt has a decline in functional status below baseline
thank you
 
Sherlyn Munoz PT

## 2020-10-05 NOTE — NUR
Nursing screen received and chart reviewed. Patient admitted with cellulitis
BLEs. Patient lives with his wife who occasionally assists with ADLs when
needed. IF patient should have a decline from baseline ADLs then refer to OT
for further assessment. Thank you.
Haritha See OTr/priti

## 2020-10-05 NOTE — NUR
NOTIFIED THEY WERE ONLY ABLE TO DO VENOUS ULTRASOUND OF RIGHT LEG
D/T PATIENT HAVING A PANIC ATTACK. IF THEY STILL WANT VENOUS ULTRASOUND OF THE
LEFT LEG IT WILL HAVE TO BE REODERED AND THEY CAN DO IT TOMORROW BUT THEY
SUGGEST PATIENT GET SOMETHING TO HELP CALM HIM DOWN BEFORE GOING.

## 2020-10-05 NOTE — NUR
in to talk to patient.
Patient states lives at HOME with WIFE AND SON.
There are 13 steps in the home.
Physician: MANDI
Pharmacy: [facility]
Home health services: NONE AT THIS TIME
Patient's level of ADLs: MINIMAL ASSIST
Patient has working utilities: YES
DME: CANE
Follow-up physician's appointment after d/c: WILL BE MADE BY HOSPITALIST NURSE
DIRECTOR ON DISCHARGE
Does patient want to access PORTAL?: NO
Discharge plan PT LIVES AT HOME WITH HIS WIFE.  STATES SHE HELPS HIM WITH SOME
ADLS SINCE HE FELL AND HURT LEG.  PT IS REQUESTING HOME HEALTH AND WHEN GIVEN
LIST OF FACILITIES, PICKED [facility].  REFERRAL HAS BEEN SENT.  WILL CONTNUE TO
FOLLOW.  STATES HE WILL HAVE A RIDE HOME ON DISCHARGE..
 
LONG,GATO

## 2020-10-06 VITALS — SYSTOLIC BLOOD PRESSURE: 100 MMHG | DIASTOLIC BLOOD PRESSURE: 69 MMHG

## 2020-10-06 VITALS — DIASTOLIC BLOOD PRESSURE: 70 MMHG

## 2020-10-06 VITALS — DIASTOLIC BLOOD PRESSURE: 65 MMHG | SYSTOLIC BLOOD PRESSURE: 106 MMHG

## 2020-10-06 VITALS — SYSTOLIC BLOOD PRESSURE: 132 MMHG | DIASTOLIC BLOOD PRESSURE: 91 MMHG

## 2020-10-06 VITALS — DIASTOLIC BLOOD PRESSURE: 80 MMHG

## 2020-10-06 LAB
BASOPHILS # BLD AUTO: 0.1 10*3/UL (ref 0–0.1)
BASOPHILS NFR BLD AUTO: 1.2 % (ref 0–1)
BUN SERPL-MCNC: 40 MG/DL (ref 7–24)
CHLORIDE SERPL-SCNC: 110 MMOL/L (ref 98–107)
CREAT SERPL-MCNC: 1.46 MG/DL (ref 0.7–1.3)
EOSINOPHIL # BLD AUTO: 0.4 10*3/UL (ref 0–0.4)
EOSINOPHIL # BLD AUTO: 4.3 % (ref 1–4)
ERYTHROCYTE [DISTWIDTH] IN BLOOD BY AUTOMATED COUNT: 18.4 % (ref 0–14.5)
HCT VFR BLD AUTO: 47.8 % (ref 42–52)
LYMPHOCYTES # BLD AUTO: 1.4 10*3/UL (ref 1.3–4.4)
LYMPHOCYTES NFR BLD AUTO: 16.4 % (ref 27–41)
MCH RBC QN AUTO: 28.9 PG (ref 27–31)
MCHC RBC AUTO-ENTMCNC: 30.5 G/DL (ref 33–37)
MCV RBC AUTO: 94.7 FL (ref 80–94)
MONOCYTES # BLD AUTO: 0.8 10*3/UL (ref 0.1–1)
MONOCYTES NFR BLD MANUAL: 9.3 % (ref 3–9)
NEUT #: 5.9 10*3/UL (ref 2.3–7.9)
NEUT %: 68.1 % (ref 47–73)
NRBC BLD QL AUTO: 0.7 % (ref 0–0)
PLATELET # BLD AUTO: 211 10*3/UL (ref 130–400)
PMV BLD AUTO: 10.2 FL (ref 9.6–12.3)
POTASSIUM SERPL-SCNC: 4.7 MMOL/L (ref 3.5–5.1)
RBC # BLD AUTO: 5.05 10*6/UL (ref 4.5–5.9)
SODIUM SERPL-SCNC: 136 MMOL/L (ref 136–145)
WBC NRBC COR # BLD AUTO: 8.6 10*3/UL (ref 4.8–10.8)

## 2020-10-06 NOTE — NUR
AMBULTATED TO BR & BACK. GAIT SLOW BUT STEADY.  AAOX3; LUNGS DIMINISHED.  PT.
VERY OBESE.  STATES BM THIS EVENING.  BILATERAL LOWER EXTREMITIES WRAPPED BY
PODIATRY TODAY.  PT. VOICES NO C/O.  CALL LIGHT WITHIN REACH.

## 2020-10-06 NOTE — NUR
PT CAN BE DISCHARGED TO HOME WHEN MEDICALLY STABLE. REFERRAL HAS BEEN SENT TO
[facility].  WILL CONTINUE TO FOLLOW.

## 2020-10-07 VITALS — SYSTOLIC BLOOD PRESSURE: 121 MMHG | DIASTOLIC BLOOD PRESSURE: 83 MMHG

## 2020-10-07 VITALS — DIASTOLIC BLOOD PRESSURE: 71 MMHG

## 2020-10-07 VITALS — DIASTOLIC BLOOD PRESSURE: 82 MMHG | SYSTOLIC BLOOD PRESSURE: 119 MMHG

## 2020-10-07 LAB
ALBUMIN SERPL-MCNC: 2.6 GM/DL (ref 3.1–4.5)
ALP SERPL-CCNC: 148 U/L (ref 45–117)
ALT SERPL W P-5'-P-CCNC: 80 U/L (ref 12–78)
AST SERPL-CCNC: 241 IU/L (ref 3–35)
BASOPHILS # BLD AUTO: 0.1 10*3/UL (ref 0–0.1)
BASOPHILS NFR BLD AUTO: 1.3 % (ref 0–1)
BUN SERPL-MCNC: 39 MG/DL (ref 7–24)
CHLORIDE SERPL-SCNC: 109 MMOL/L (ref 98–107)
CREAT SERPL-MCNC: 1.45 MG/DL (ref 0.7–1.3)
EOSINOPHIL # BLD AUTO: 0.3 10*3/UL (ref 0–0.4)
EOSINOPHIL # BLD AUTO: 3.4 % (ref 1–4)
ERYTHROCYTE [DISTWIDTH] IN BLOOD BY AUTOMATED COUNT: 18.6 % (ref 0–14.5)
HCT VFR BLD AUTO: 48 % (ref 42–52)
LYMPHOCYTES # BLD AUTO: 1.3 10*3/UL (ref 1.3–4.4)
LYMPHOCYTES NFR BLD AUTO: 14 % (ref 27–41)
MCH RBC QN AUTO: 28.5 PG (ref 27–31)
MCHC RBC AUTO-ENTMCNC: 30.2 G/DL (ref 33–37)
MCV RBC AUTO: 94.5 FL (ref 80–94)
MONOCYTES # BLD AUTO: 1.1 10*3/UL (ref 0.1–1)
MONOCYTES NFR BLD MANUAL: 11.4 % (ref 3–9)
NEUT #: 6.5 10*3/UL (ref 2.3–7.9)
NEUT %: 68.6 % (ref 47–73)
NRBC BLD QL AUTO: 0 10*3/UL (ref 0–0)
PLATELET # BLD AUTO: 208 10*3/UL (ref 130–400)
PMV BLD AUTO: 9.9 FL (ref 9.6–12.3)
POTASSIUM SERPL-SCNC: 4.8 MMOL/L (ref 3.5–5.1)
PROT SERPL-MCNC: 6.7 GM/DL (ref 6.4–8.2)
RBC # BLD AUTO: 5.08 10*6/UL (ref 4.5–5.9)
SODIUM SERPL-SCNC: 137 MMOL/L (ref 136–145)
WBC NRBC COR # BLD AUTO: 9.5 10*3/UL (ref 4.8–10.8)

## 2020-10-07 NOTE — NUR
Discharge instructions reviewed with patient/family. Patient receptive and
verbalizes understanding. Follow-up care arranged. Written instructions given
to patient/family.
NORBERTO IRELAND.

## 2020-10-07 NOTE — NUR
PT WILL RETURN HOME WHEN MEDICALLY STABLE WITH [facility] SERVICES.  REFERRAL HAS
BEEN SENT.

## 2021-02-19 ENCOUNTER — HOSPITAL ENCOUNTER (EMERGENCY)
Dept: HOSPITAL 83 - ED | Age: 51
End: 2021-02-19
Payer: COMMERCIAL

## 2021-02-19 VITALS — HEIGHT: 60 IN | WEIGHT: 315 LBS

## 2021-02-19 DIAGNOSIS — I11.0: ICD-10-CM

## 2021-02-19 DIAGNOSIS — E66.01: ICD-10-CM

## 2021-02-19 DIAGNOSIS — F41.9: ICD-10-CM

## 2021-02-19 DIAGNOSIS — Z90.89: ICD-10-CM

## 2021-02-19 DIAGNOSIS — I46.9: Primary | ICD-10-CM

## 2021-02-19 DIAGNOSIS — Z87.891: ICD-10-CM

## 2021-02-19 DIAGNOSIS — F32.9: ICD-10-CM

## 2021-02-19 DIAGNOSIS — Z95.0: ICD-10-CM

## 2021-02-19 DIAGNOSIS — I50.9: ICD-10-CM
